# Patient Record
Sex: MALE | Race: WHITE | NOT HISPANIC OR LATINO | Employment: OTHER | URBAN - NONMETROPOLITAN AREA
[De-identification: names, ages, dates, MRNs, and addresses within clinical notes are randomized per-mention and may not be internally consistent; named-entity substitution may affect disease eponyms.]

---

## 2023-10-16 ENCOUNTER — TELEPHONE (OUTPATIENT)
Dept: CARDIOLOGY | Facility: CLINIC | Age: 63
End: 2023-10-16
Payer: COMMERCIAL

## 2023-10-16 NOTE — TELEPHONE ENCOUNTER
"Relay     \"Calling to confirm appointment on 10/18 with Hasmukh Orellana. He is now located in the basement. You will drive around to the back of the building, come in the door and turn right. If you have had any recent blood work or been in the hospital or ER please let us know when and where so we can obtain those records. Thank you\"          "

## 2023-10-18 ENCOUNTER — OFFICE VISIT (OUTPATIENT)
Dept: CARDIOLOGY | Facility: CLINIC | Age: 63
End: 2023-10-18
Payer: COMMERCIAL

## 2023-10-18 VITALS
HEIGHT: 68 IN | SYSTOLIC BLOOD PRESSURE: 151 MMHG | HEART RATE: 65 BPM | OXYGEN SATURATION: 98 % | BODY MASS INDEX: 26.61 KG/M2 | DIASTOLIC BLOOD PRESSURE: 71 MMHG | WEIGHT: 175.6 LBS

## 2023-10-18 DIAGNOSIS — R06.09 DYSPNEA ON EXERTION: ICD-10-CM

## 2023-10-18 DIAGNOSIS — R07.2 PRECORDIAL PAIN: Primary | ICD-10-CM

## 2023-10-18 DIAGNOSIS — I10 PRIMARY HYPERTENSION: ICD-10-CM

## 2023-10-18 PROCEDURE — 99203 OFFICE O/P NEW LOW 30 MIN: CPT | Performed by: PHYSICIAN ASSISTANT

## 2023-10-18 RX ORDER — FERROUS SULFATE 325(65) MG
325 TABLET ORAL
COMMUNITY

## 2023-10-18 RX ORDER — VERAPAMIL HYDROCHLORIDE 120 MG/1
120 TABLET, FILM COATED ORAL DAILY
COMMUNITY

## 2023-10-18 RX ORDER — ENALAPRIL MALEATE 10 MG/1
10 TABLET ORAL 2 TIMES DAILY
COMMUNITY

## 2023-10-18 RX ORDER — SEMAGLUTIDE 1.34 MG/ML
1 INJECTION, SOLUTION SUBCUTANEOUS
COMMUNITY
Start: 2023-10-10

## 2023-10-18 RX ORDER — LANOLIN ALCOHOL/MO/W.PET/CERES
1000 CREAM (GRAM) TOPICAL DAILY
COMMUNITY

## 2023-10-18 RX ORDER — MULTIVIT WITH MINERALS/LUTEIN
250 TABLET ORAL DAILY
COMMUNITY

## 2023-10-18 RX ORDER — DAPAGLIFLOZIN 10 MG/1
0.5 TABLET, FILM COATED ORAL DAILY
COMMUNITY

## 2023-10-18 RX ORDER — ATORVASTATIN CALCIUM 40 MG/1
40 TABLET, FILM COATED ORAL DAILY
COMMUNITY

## 2025-05-23 NOTE — PROGRESS NOTES
Patient has Abnormal Magnesium: hypomagnesemia. Will continue to monitor electrolytes closely. Will replace the affected electrolytes and repeat labs to be done after interventions completed. The patient's magnesium results have been reviewed and are listed below.  Recent Labs   Lab 05/23/25  0458   MG 1.60*       Subjective   Dung Hair is a 63 y.o. male     Chief Complaint   Patient presents with    [facility]     Cardiac eval       HPI  The patient presents in the clinic today to establish cardiovascular care.  He historically has been followed through [facility] with regards to cardiac care.  His prior service provider at that clinic retired and now he would like to establish care locally.  He presents today in that setting.  Previous history includes reported hemorrhagic stroke in 2012.  That was followed to neurosurgical services for some time, and eventually felt stable enough that no further follow-up was felt warranted through that service.  3 years ago, while at a conference, the patient developed a severe onset of precordial chest tightness and pressure.  There was a referral through to the mid back.  He had some degree of left upper extremity discomfort.  He eventually had diaphoresis and severe dyspnea.  He was taken to his local emergency room and eventually had admission.  He was evaluated for 3 days basically with cardiac biomarkers, serial EKGs, and close monitoring otherwise.  He apparently did not have testing at that time from general cardiovascular standpoint.  As he ruled out for acute myocardial infarction, he was discharged for outpatient care closer to home.  He eventually established care with [facility] as above.  This was performed 2 to 3 years ago.  Apparently he had a stress test and an echo at that time.  Those studies were reportedly normal.  He now presents to establish care locally.  Clinically, the patient does extraordinarily well.  He denies any angina.  He has stable dyspnea.  He has no palpitations, dizziness, or syncope.  There has been no symptoms of PND nor orthopnea.  He did have some degree of orthostasis 4 to 6 months ago.  He was seen by his primary care provider where medications were adjusted.  He has had no further orthostatic issues nor symptoms related to the  same otherwise.  He has no further complaints and feels that he is doing well.      Current Outpatient Medications   Medication Sig Dispense Refill    atorvastatin (LIPITOR) 40 MG tablet Take 1 tablet by mouth Daily.      enalapril (VASOTEC) 10 MG tablet Take 1 tablet by mouth 2 (Two) Times a Day.      Farxiga 10 MG tablet Take 5 mg by mouth Daily.      ferrous sulfate 325 (65 FE) MG tablet Take 1 tablet by mouth Daily With Breakfast.      metFORMIN (GLUCOPHAGE) 1000 MG tablet Take 1 tablet by mouth Daily With Breakfast. PATIENT TAKES 500MG IN AM, 1000MG AT NIGHT      Semaglutide,0.25 or 0.5MG/DOS, (Ozempic, 0.25 or 0.5 MG/DOSE,) 2 MG/1.5ML solution pen-injector Inject 1 mg under the skin into the appropriate area as directed Every 7 (Seven) Days.      verapamil (CALAN) 120 MG tablet Take 1 tablet by mouth Daily.      vitamin B-12 (CYANOCOBALAMIN) 1000 MCG tablet Take 1 tablet by mouth Daily.      vitamin C (ASCORBIC ACID) 250 MG tablet Take 1 tablet by mouth Daily.      vitamin D3 125 MCG (5000 UT) capsule capsule Take 1 capsule by mouth Daily.       No current facility-administered medications for this visit.       Other    Past Medical History:   Diagnosis Date    Diabetes mellitus     Hyperlipidemia     Hypertension     Stroke        Social History     Socioeconomic History    Marital status:    Tobacco Use    Smoking status: Never    Smokeless tobacco: Never   Substance and Sexual Activity    Alcohol use: Not Currently    Drug use: Never    Sexual activity: Defer       Family History   Problem Relation Age of Onset    No Known Problems Mother     Hypertension Father     Cancer Father     Dementia Father        Review of Systems   Constitutional: Negative.  Negative for chills, diaphoresis, fatigue and fever.   HENT: Negative.     Eyes: Negative.  Negative for visual disturbance.   Respiratory: Negative.  Negative for apnea, cough, chest tightness, shortness of breath and wheezing.    Cardiovascular:  "Negative.  Negative for chest pain, palpitations and leg swelling.   Gastrointestinal: Negative.  Negative for abdominal pain and blood in stool.   Endocrine: Negative.  Negative for cold intolerance and heat intolerance.   Genitourinary: Negative.  Negative for hematuria.   Musculoskeletal:  Positive for arthralgias (HANDS) and back pain. Negative for myalgias, neck pain and neck stiffness.   Skin: Negative.  Negative for rash and wound.   Allergic/Immunologic: Positive for environmental allergies (SEASONAL). Negative for food allergies.   Neurological: Negative.  Negative for dizziness, syncope, weakness, light-headedness, numbness and headaches.   Hematological:  Bruises/bleeds easily.   Psychiatric/Behavioral: Negative.  Negative for sleep disturbance.        Objective     Vitals:    10/18/23 0930   BP: 151/71   BP Location: Left arm   Patient Position: Sitting   Cuff Size: Adult   Pulse: 65   SpO2: 98%   Weight: 79.7 kg (175 lb 9.6 oz)   Height: 172.7 cm (68\")        /71 (BP Location: Left arm, Patient Position: Sitting, Cuff Size: Adult)   Pulse 65   Ht 172.7 cm (68\")   Wt 79.7 kg (175 lb 9.6 oz)   SpO2 98%   BMI 26.70 kg/m²      Lab Results (most recent)       None            Physical Exam  Vitals and nursing note reviewed.   Constitutional:       General: He is not in acute distress.     Appearance: He is well-developed.   HENT:      Head: Normocephalic and atraumatic.   Eyes:      Conjunctiva/sclera: Conjunctivae normal.      Pupils: Pupils are equal, round, and reactive to light.   Neck:      Vascular: No JVD.      Trachea: No tracheal deviation.   Cardiovascular:      Rate and Rhythm: Normal rate and regular rhythm.      Heart sounds: Normal heart sounds.   Pulmonary:      Effort: Pulmonary effort is normal.      Breath sounds: Normal breath sounds.   Abdominal:      General: Bowel sounds are normal. There is no distension.      Palpations: Abdomen is soft. There is no mass.      Tenderness: " There is no abdominal tenderness. There is no guarding or rebound.   Musculoskeletal:         General: No tenderness or deformity. Normal range of motion.      Cervical back: Normal range of motion and neck supple.   Skin:     General: Skin is warm and dry.      Coloration: Skin is not pale.      Findings: No erythema or rash.   Neurological:      Mental Status: He is alert and oriented to person, place, and time.   Psychiatric:         Behavior: Behavior normal.         Thought Content: Thought content normal.         Judgment: Judgment normal.         Procedure   Procedures         Assessment & Plan      Diagnosis Plan   1. Precordial pain        2. Dyspnea on exertion        3. Primary hypertension          1.  The patient presents to the clinic today to establish cardiovascular care.  He was followed historically through cardiology in [facility].  He now presents to establish care locally.  Clinically, the patient does extraordinarily well.  He has had no further chest pain since his episode 2 to 3 years ago, as above.  His noninvasive work-up at that time was unremarkable.  We discussed consideration for updating those studies.  As the patient is doing so well, we collectively agreed to hold on any further evaluation for now.    2.  Otherwise, dyspnea is at baseline.  He has no symptoms otherwise of angina, failure, or dysrhythmias at this time.    3.  There was concern for 6 months ago with orthostatic symptoms.  Medications reportedly were adjusted with his primary care provider and those symptoms have now resolved.  He will monitor for that and call for any recurrent issues.    4.  As the patient is doing well, nothing further.  He will report back immediately for any symptoms.  We will continue to see the patient on routine 6 to 9-month intervals.  He appears to be on appropriate medications and we will make no adjustments at the same.                 Electronically signed by: